# Patient Record
Sex: MALE | Race: WHITE | Employment: OTHER | URBAN - NONMETROPOLITAN AREA
[De-identification: names, ages, dates, MRNs, and addresses within clinical notes are randomized per-mention and may not be internally consistent; named-entity substitution may affect disease eponyms.]

---

## 2017-01-01 ENCOUNTER — OFFICE VISIT (OUTPATIENT)
Dept: FAMILY MEDICINE CLINIC | Age: 82
End: 2017-01-01
Payer: MEDICARE

## 2017-01-01 ENCOUNTER — TELEPHONE (OUTPATIENT)
Dept: FAMILY MEDICINE CLINIC | Age: 82
End: 2017-01-01

## 2017-01-01 ENCOUNTER — OFFICE VISIT (OUTPATIENT)
Dept: FAMILY MEDICINE CLINIC | Age: 82
End: 2017-01-01

## 2017-01-01 VITALS
HEART RATE: 88 BPM | SYSTOLIC BLOOD PRESSURE: 132 MMHG | HEIGHT: 66 IN | RESPIRATION RATE: 16 BRPM | BODY MASS INDEX: 20.57 KG/M2 | TEMPERATURE: 97.2 F | WEIGHT: 128 LBS | DIASTOLIC BLOOD PRESSURE: 78 MMHG

## 2017-01-01 VITALS
WEIGHT: 133 LBS | BODY MASS INDEX: 20.68 KG/M2 | HEART RATE: 100 BPM | RESPIRATION RATE: 16 BRPM | SYSTOLIC BLOOD PRESSURE: 134 MMHG | TEMPERATURE: 97.8 F | DIASTOLIC BLOOD PRESSURE: 62 MMHG

## 2017-01-01 VITALS
DIASTOLIC BLOOD PRESSURE: 76 MMHG | WEIGHT: 128 LBS | SYSTOLIC BLOOD PRESSURE: 122 MMHG | RESPIRATION RATE: 18 BRPM | HEART RATE: 80 BPM | TEMPERATURE: 98.2 F | BODY MASS INDEX: 19.9 KG/M2

## 2017-01-01 VITALS
WEIGHT: 126 LBS | DIASTOLIC BLOOD PRESSURE: 78 MMHG | TEMPERATURE: 97.7 F | BODY MASS INDEX: 20.25 KG/M2 | SYSTOLIC BLOOD PRESSURE: 128 MMHG | RESPIRATION RATE: 16 BRPM | HEART RATE: 88 BPM | HEIGHT: 66 IN

## 2017-01-01 DIAGNOSIS — R32 URINARY INCONTINENCE, UNSPECIFIED TYPE: ICD-10-CM

## 2017-01-01 DIAGNOSIS — F03.90 SENILE DEMENTIA WITHOUT BEHAVIORAL DISTURBANCE (HCC): Primary | ICD-10-CM

## 2017-01-01 DIAGNOSIS — M15.9 PRIMARY OSTEOARTHRITIS INVOLVING MULTIPLE JOINTS: ICD-10-CM

## 2017-01-01 DIAGNOSIS — E53.8 B12 DEFICIENCY: ICD-10-CM

## 2017-01-01 DIAGNOSIS — R53.81 PHYSICAL DECONDITIONING: ICD-10-CM

## 2017-01-01 DIAGNOSIS — D50.0 IRON DEFICIENCY ANEMIA SECONDARY TO BLOOD LOSS (CHRONIC): Primary | ICD-10-CM

## 2017-01-01 DIAGNOSIS — R29.6 FREQUENT FALLS: Primary | ICD-10-CM

## 2017-01-01 DIAGNOSIS — N40.0 PROSTATE HYPERTROPHY: ICD-10-CM

## 2017-01-01 DIAGNOSIS — R29.6 FREQUENT FALLS: ICD-10-CM

## 2017-01-01 DIAGNOSIS — R10.9 ACUTE RIGHT FLANK PAIN: Primary | ICD-10-CM

## 2017-01-01 LAB
ABSOLUTE BASO #: 0 /CMM (ref 0–200)
ABSOLUTE EOS #: 400 /CMM (ref 0–500)
ABSOLUTE LYMPH #: 800 /CMM (ref 1000–4800)
ABSOLUTE MONO #: 500 /CMM (ref 0–800)
ABSOLUTE NEUT #: 4600 /CMM (ref 1800–7700)
BASOPHILS RELATIVE PERCENT: 0.6 % (ref 0–2)
EOSINOPHILS RELATIVE PERCENT: 6 % (ref 0–6)
FERRITIN: 25 NG/ML (ref 24–336)
FOLATE: > 23.6 NG/ML
HCT VFR BLD CALC: 38.5 % (ref 40–49)
HEMOGLOBIN: 12.7 GM/DL (ref 13.5–16.5)
IRON SATURATION: 19 % (ref 20–50)
IRON, SERUM: 62 MCG/DL (ref 45–182)
LYMPHOCYTES RELATIVE PERCENT: 12.9 % (ref 15–45)
MCH RBC QN AUTO: 31.3 PG (ref 27.5–33)
MCHC RBC AUTO-ENTMCNC: 33 GM/DL (ref 33–36)
MCV RBC AUTO: 95.1 CU MIC (ref 80–97)
MONOCYTES RELATIVE PERCENT: 7.6 % (ref 2–10)
NEUTROPHILS RELATIVE PERCENT: 72.9 % (ref 40–70)
NUCLEATED RBCS: 0.1 /100 WBC
PDW BLD-RTO: 13.4 % (ref 12–16)
PLATELET # BLD: 302 TH/CMM (ref 150–400)
PROSTATE SPECIFIC ANTIGEN: 2.24 NG/ML
RBC # BLD: 4.05 MIL/CMM (ref 4.5–6)
TRANSFERRIN: 226 MG/DL (ref 180–329)
TSH REFLEX: 1.17 MCIU/ML (ref 0.49–4.67)
VITAMIN B-12: 923 PG/ML (ref 180–914)
WBC # BLD: 6.4 TH/CMM (ref 4.4–10.5)

## 2017-01-01 PROCEDURE — 99214 OFFICE O/P EST MOD 30 MIN: CPT | Performed by: NURSE PRACTITIONER

## 2017-01-01 PROCEDURE — 99213 OFFICE O/P EST LOW 20 MIN: CPT | Performed by: NURSE PRACTITIONER

## 2017-01-01 RX ORDER — CYANOCOBALAMIN 1000 UG/ML
1000 INJECTION INTRAMUSCULAR; SUBCUTANEOUS
Qty: 1 ML | Refills: 5 | Status: SHIPPED | OUTPATIENT
Start: 2017-01-01 | End: 2017-01-01 | Stop reason: SDUPTHER

## 2017-01-01 RX ORDER — CYANOCOBALAMIN 1000 UG/ML
1000 INJECTION INTRAMUSCULAR; SUBCUTANEOUS
Qty: 1 ML | Refills: 4 | Status: SHIPPED | OUTPATIENT
Start: 2017-01-01

## 2017-01-01 RX ORDER — TAMSULOSIN HYDROCHLORIDE 0.4 MG/1
0.4 CAPSULE ORAL DAILY
Qty: 30 CAPSULE | Refills: 3 | Status: SHIPPED | OUTPATIENT
Start: 2017-01-01

## 2017-01-01 RX ORDER — CYANOCOBALAMIN 1000 UG/ML
1000 INJECTION INTRAMUSCULAR; SUBCUTANEOUS
Qty: 1 ML | Refills: 4 | Status: SHIPPED | OUTPATIENT
Start: 2017-01-01 | End: 2017-01-01 | Stop reason: SDUPTHER

## 2017-01-03 ENCOUNTER — OFFICE VISIT (OUTPATIENT)
Dept: FAMILY MEDICINE CLINIC | Age: 82
End: 2017-01-03

## 2017-01-03 VITALS
DIASTOLIC BLOOD PRESSURE: 62 MMHG | BODY MASS INDEX: 20.93 KG/M2 | WEIGHT: 125.6 LBS | HEART RATE: 88 BPM | RESPIRATION RATE: 16 BRPM | TEMPERATURE: 97.7 F | HEIGHT: 65 IN | SYSTOLIC BLOOD PRESSURE: 110 MMHG

## 2017-01-03 DIAGNOSIS — D50.0 IRON DEFICIENCY ANEMIA SECONDARY TO BLOOD LOSS (CHRONIC): ICD-10-CM

## 2017-01-03 DIAGNOSIS — K21.9 GASTROESOPHAGEAL REFLUX DISEASE WITHOUT ESOPHAGITIS: ICD-10-CM

## 2017-01-03 DIAGNOSIS — A49.1 STREPTOCOCCUS VIRIDANS INFECTION: ICD-10-CM

## 2017-01-03 DIAGNOSIS — E53.8 FOLIC ACID DEFICIENCY: Primary | ICD-10-CM

## 2017-01-03 DIAGNOSIS — M15.9 PRIMARY OSTEOARTHRITIS INVOLVING MULTIPLE JOINTS: ICD-10-CM

## 2017-01-03 DIAGNOSIS — E27.8 ADRENAL MASS, LEFT (HCC): ICD-10-CM

## 2017-01-03 PROBLEM — I10 ESSENTIAL HYPERTENSION: Status: ACTIVE | Noted: 2017-01-03

## 2017-01-03 PROBLEM — F03.90 SENILE DEMENTIA WITHOUT BEHAVIORAL DISTURBANCE (HCC): Status: ACTIVE | Noted: 2017-01-03

## 2017-01-03 PROBLEM — M19.90 DEGENERATIVE JOINT DISEASE: Status: ACTIVE | Noted: 2017-01-03

## 2017-01-03 PROBLEM — E78.00 PURE HYPERCHOLESTEROLEMIA: Status: ACTIVE | Noted: 2017-01-03

## 2017-01-03 PROBLEM — Z86.73 HISTORY OF CVA (CEREBROVASCULAR ACCIDENT): Status: ACTIVE | Noted: 2017-01-03

## 2017-01-03 PROCEDURE — 99204 OFFICE O/P NEW MOD 45 MIN: CPT | Performed by: NURSE PRACTITIONER

## 2017-01-03 RX ORDER — FOLIC ACID 1 MG/1
1 TABLET ORAL DAILY
COMMUNITY
End: 2017-01-03 | Stop reason: SDUPTHER

## 2017-01-03 RX ORDER — TRAMADOL HYDROCHLORIDE 50 MG/1
50 TABLET ORAL EVERY 6 HOURS PRN
Qty: 30 TABLET | Refills: 2 | Status: SHIPPED | OUTPATIENT
Start: 2017-01-03 | End: 2017-01-01

## 2017-01-03 RX ORDER — PANTOPRAZOLE SODIUM 40 MG/1
40 GRANULE, DELAYED RELEASE ORAL
Qty: 30 EACH | Status: CANCELLED | OUTPATIENT
Start: 2017-01-03

## 2017-01-03 RX ORDER — PANTOPRAZOLE SODIUM 40 MG/1
40 TABLET, DELAYED RELEASE ORAL DAILY
Qty: 30 TABLET | Refills: 11 | Status: SHIPPED | OUTPATIENT
Start: 2017-01-03

## 2017-01-03 RX ORDER — ASPIRIN/CALCIUM/MAG/ALUMINUM 325 MG
TABLET ORAL DAILY PRN
COMMUNITY

## 2017-01-03 RX ORDER — PANTOPRAZOLE SODIUM 40 MG/1
40 GRANULE, DELAYED RELEASE ORAL
COMMUNITY
End: 2017-01-01 | Stop reason: SDUPTHER

## 2017-01-03 RX ORDER — DOCUSATE SODIUM 100 MG/1
100 CAPSULE, LIQUID FILLED ORAL 2 TIMES DAILY
COMMUNITY
End: 2017-01-01

## 2017-01-03 RX ORDER — ACETAMINOPHEN 325 MG/1
650 TABLET ORAL EVERY 6 HOURS PRN
COMMUNITY

## 2017-01-03 RX ORDER — FERROUS SULFATE 325(65) MG
325 TABLET ORAL
Qty: 90 TABLET | Refills: 11 | Status: SHIPPED | OUTPATIENT
Start: 2017-01-03 | End: 2018-01-01 | Stop reason: SDUPTHER

## 2017-01-03 RX ORDER — FOLIC ACID 1 MG/1
1 TABLET ORAL DAILY
Qty: 30 TABLET | Refills: 11 | Status: SHIPPED | OUTPATIENT
Start: 2017-01-03 | End: 2018-01-01 | Stop reason: SDUPTHER

## 2017-01-03 RX ORDER — TRAMADOL HYDROCHLORIDE 50 MG/1
50 TABLET ORAL EVERY 6 HOURS PRN
COMMUNITY
End: 2017-01-01

## 2017-01-03 RX ORDER — CYANOCOBALAMIN 1000 UG/ML
1000 INJECTION INTRAMUSCULAR; SUBCUTANEOUS
COMMUNITY
End: 2017-01-09 | Stop reason: SDUPTHER

## 2017-01-03 RX ORDER — FERROUS SULFATE 325(65) MG
325 TABLET ORAL
COMMUNITY
End: 2017-01-03 | Stop reason: SDUPTHER

## 2017-01-03 RX ORDER — CYANOCOBALAMIN 1000 UG/ML
1000 INJECTION INTRAMUSCULAR; SUBCUTANEOUS
Qty: 1 ML | Refills: 4 | Status: SHIPPED | OUTPATIENT
Start: 2017-01-03 | End: 2017-01-01 | Stop reason: SDUPTHER

## 2017-01-04 ENCOUNTER — TELEPHONE (OUTPATIENT)
Dept: FAMILY MEDICINE CLINIC | Age: 82
End: 2017-01-04

## 2017-01-04 DIAGNOSIS — I10 ESSENTIAL HYPERTENSION: ICD-10-CM

## 2017-01-04 DIAGNOSIS — F03.90 SENILE DEMENTIA WITHOUT BEHAVIORAL DISTURBANCE (HCC): Primary | ICD-10-CM

## 2017-01-04 DIAGNOSIS — D50.0 IRON DEFICIENCY ANEMIA SECONDARY TO BLOOD LOSS (CHRONIC): ICD-10-CM

## 2017-01-04 LAB
A/G RATIO: 2.5 (ref 1.5–2.5)
ALBUMIN SERPL-MCNC: 4.5 GM/DL (ref 3.5–5)
ALP BLD-CCNC: 61 IU/L (ref 41–137)
ALT SERPL-CCNC: 10 IU/L (ref 10–40)
ANION GAP SERPL CALCULATED.3IONS-SCNC: 9 MMOL/L (ref 4–12)
AST SERPL-CCNC: 20 IU/L (ref 15–41)
BILIRUB SERPL-MCNC: 0.2 MG/DL (ref 0.2–1)
BUN BLDV-MCNC: 22 MG/DL (ref 7–20)
CALCIUM SERPL-MCNC: 9.2 MG/DL (ref 8.8–10.5)
CHLORIDE BLD-SCNC: 101 MEQ/L (ref 101–111)
CO2: 26 MEQ/L (ref 21–32)
CREAT SERPL-MCNC: 0.72 MG/DL (ref 0.7–1.3)
CREATININE CLEARANCE: >60
FOLATE: 20.1 NG/ML
GLUCOSE: 95 MG/DL (ref 70–110)
POTASSIUM SERPL-SCNC: 4.4 MEQ/L (ref 3.6–5)
SODIUM BLD-SCNC: 136 MEQ/L (ref 135–145)
TOTAL PROTEIN: 6.3 G/DL (ref 6.2–8)
VITAMIN B-12: 474 PG/ML (ref 180–914)

## 2017-01-05 ENCOUNTER — TELEPHONE (OUTPATIENT)
Dept: FAMILY MEDICINE CLINIC | Age: 82
End: 2017-01-05

## 2017-01-05 LAB
ABSOLUTE BASO #: 0 /CMM (ref 0–200)
ABSOLUTE EOS #: 100 /CMM (ref 0–500)
ABSOLUTE LYMPH #: 800 /CMM (ref 1000–4800)
ABSOLUTE MONO #: 500 /CMM (ref 0–800)
ABSOLUTE NEUT #: 3500 /CMM (ref 1800–7700)
ANISOCYTOSIS: ABNORMAL
BASOPHILS RELATIVE PERCENT: 0.9 % (ref 0–2)
EOSINOPHILS RELATIVE PERCENT: 2.3 % (ref 0–6)
HCT VFR BLD CALC: 32.5 % (ref 40–49)
HEMOGLOBIN: 10.4 GM/DL (ref 13.5–16.5)
HYPOCHROMIA: ABNORMAL
LYMPHOCYTES RELATIVE PERCENT: 16.4 % (ref 15–45)
MCH RBC QN AUTO: 27 PG (ref 27.5–33)
MCHC RBC AUTO-ENTMCNC: 31.9 GM/DL (ref 33–36)
MCV RBC AUTO: 84.7 CU MIC (ref 80–97)
MONOCYTES RELATIVE PERCENT: 10 % (ref 2–10)
NEUTROPHILS RELATIVE PERCENT: 70.4 % (ref 40–70)
NUCLEATED RBCS: 0.1 /100 WBC
PDW BLD-RTO: 33.9 % (ref 12–16)
PLATELET # BLD: 258 TH/CMM (ref 150–400)
RBC # BLD: 3.84 MIL/CMM (ref 4.5–6)
SLIDE REVIEW: NORMAL
WBC # BLD: 4.9 TH/CMM (ref 4.4–10.5)

## 2017-01-06 ENCOUNTER — NURSE ONLY (OUTPATIENT)
Dept: FAMILY MEDICINE CLINIC | Age: 82
End: 2017-01-06

## 2017-01-06 DIAGNOSIS — E53.8 B12 DEFICIENCY: Primary | ICD-10-CM

## 2017-01-06 PROCEDURE — 96372 THER/PROPH/DIAG INJ SC/IM: CPT | Performed by: NURSE PRACTITIONER

## 2017-01-06 RX ORDER — CYANOCOBALAMIN 1000 UG/ML
1000 INJECTION INTRAMUSCULAR; SUBCUTANEOUS ONCE
Status: COMPLETED | OUTPATIENT
Start: 2017-01-06 | End: 2017-01-06

## 2017-01-06 RX ADMIN — CYANOCOBALAMIN 1000 MCG: 1000 INJECTION INTRAMUSCULAR; SUBCUTANEOUS at 13:55

## 2017-01-09 ENCOUNTER — TELEPHONE (OUTPATIENT)
Dept: FAMILY MEDICINE CLINIC | Age: 82
End: 2017-01-09

## 2017-01-09 DIAGNOSIS — E53.8 B12 DEFICIENCY: Primary | ICD-10-CM

## 2017-01-09 DIAGNOSIS — E53.8 B12 DEFICIENCY: ICD-10-CM

## 2017-01-09 RX ORDER — CYANOCOBALAMIN 1000 UG/ML
1000 INJECTION INTRAMUSCULAR; SUBCUTANEOUS
Qty: 1 ML | Refills: 5 | Status: SHIPPED | OUTPATIENT
Start: 2017-01-09 | End: 2017-01-09 | Stop reason: SDUPTHER

## 2017-01-09 RX ORDER — CYANOCOBALAMIN 1000 UG/ML
1000 INJECTION INTRAMUSCULAR; SUBCUTANEOUS
Qty: 1 ML | Refills: 5 | Status: SHIPPED | OUTPATIENT
Start: 2017-01-09 | End: 2017-01-01 | Stop reason: SDUPTHER

## 2017-03-02 ENCOUNTER — OFFICE VISIT (OUTPATIENT)
Dept: FAMILY MEDICINE CLINIC | Age: 82
End: 2017-03-02

## 2017-03-02 VITALS
HEART RATE: 88 BPM | DIASTOLIC BLOOD PRESSURE: 62 MMHG | SYSTOLIC BLOOD PRESSURE: 118 MMHG | HEIGHT: 67 IN | WEIGHT: 133 LBS | BODY MASS INDEX: 20.88 KG/M2 | RESPIRATION RATE: 16 BRPM | TEMPERATURE: 97.7 F

## 2017-03-02 DIAGNOSIS — Z71.89 DNR (DO NOT RESUSCITATE) DISCUSSION: ICD-10-CM

## 2017-03-02 DIAGNOSIS — D50.0 IRON DEFICIENCY ANEMIA SECONDARY TO BLOOD LOSS (CHRONIC): Primary | ICD-10-CM

## 2017-03-02 DIAGNOSIS — E53.8 FOLIC ACID DEFICIENCY: ICD-10-CM

## 2017-03-02 DIAGNOSIS — E53.8 B12 DEFICIENCY: ICD-10-CM

## 2017-03-02 DIAGNOSIS — K63.89 COLONIC MASS: ICD-10-CM

## 2017-03-02 PROCEDURE — 3288F FALL RISK ASSESSMENT DOCD: CPT | Performed by: NURSE PRACTITIONER

## 2017-03-02 PROCEDURE — 99214 OFFICE O/P EST MOD 30 MIN: CPT | Performed by: NURSE PRACTITIONER

## 2017-03-02 PROCEDURE — G8510 SCR DEP NEG, NO PLAN REQD: HCPCS | Performed by: NURSE PRACTITIONER

## 2017-03-02 ASSESSMENT — PATIENT HEALTH QUESTIONNAIRE - PHQ9
SUM OF ALL RESPONSES TO PHQ QUESTIONS 1-9: 0
SUM OF ALL RESPONSES TO PHQ9 QUESTIONS 1 & 2: 0
2. FEELING DOWN, DEPRESSED OR HOPELESS: 0
1. LITTLE INTEREST OR PLEASURE IN DOING THINGS: 0

## 2017-04-04 ENCOUNTER — TELEPHONE (OUTPATIENT)
Dept: FAMILY MEDICINE CLINIC | Age: 82
End: 2017-04-04

## 2017-04-04 DIAGNOSIS — D50.0 IRON DEFICIENCY ANEMIA SECONDARY TO BLOOD LOSS (CHRONIC): ICD-10-CM

## 2017-04-04 DIAGNOSIS — I10 ESSENTIAL HYPERTENSION: ICD-10-CM

## 2017-04-04 DIAGNOSIS — E53.8 B12 DEFICIENCY: Primary | ICD-10-CM

## 2017-04-04 DIAGNOSIS — E53.8 FOLIC ACID DEFICIENCY: ICD-10-CM

## 2017-04-04 DIAGNOSIS — F03.90 SENILE DEMENTIA WITHOUT BEHAVIORAL DISTURBANCE (HCC): ICD-10-CM

## 2017-11-09 NOTE — TELEPHONE ENCOUNTER
Call Geena Clemente, Jey's wife, to see if he went to the ER at [facility] yesterday like they were directed to. She reports she went over to the ER and sounds like maybe outpatient lab, but they never got seen by a doctor or nurse practitioner. They waited around for 2 hours to see what orders were supposed to be done, they apparently thought I had sent orders over for them to get done, but when nothing was done, they left and went home. Geena Clemetne reports Felisa Buggy dementia is getting worse, and she has to help him with everything. She is weak herself and can barely lift him. She feels like he needs to be in a nursing home. Is still incontinent of urine, but did actually urinate in the waste paper basket. Directed Geena Clemente to take him back to the ER at [facility] for evaluation by a doctor, suspect fractures, possible UTI and/or pyelonephritis and needs further testing as he has been more confused, weak, incontinent and falling frequently. She verbalized understanding.

## 2017-11-28 NOTE — PROGRESS NOTES
Chief Complaint   Patient presents with    Hypertension     6 month F/U HTN and other chronic conditions. History obtained from chart review and the patient. SUBJECTIVE:  Bernard Raman is a 80 y.o. male that presents today for follow up chronic medical conditions. Since last visit, the patient was seen in the ER at [facility] for right back pain after frequent falls. He got evaluated, had xrays, urine tests labs etc and everything was within normal range. Was diagnosed with back strain/contusion. Wife reports that Jordana Tavera wets himself frequently. He denies this. He states that he has a good urine stream, denies any dribbling. He reports that he is usually able to make it to the bathroom. He denies any nocturia. He states that he does not wet himself at night. Dementia    HPI:    History is obtained from the patient and spouse. Spouse reports that he was on medications in the past for the dementia and they actually made things worse. New concerns - wife reports that he can't find his way to the bedroom, is having incontinence problems  Since last visit, memory has been getting worse. In terms of ADLs, he needs no help with anything. In terms of IADLs he needs complete help with answering telephone, finances, everything. Driving - unable to drive. Continence - abnormal: having incontinence of urine, but bowels moving normally.     The patient is left alone for 1-2 hours at the most.    Ambulation is normal.         Age/Gender Health Maintenance    Lipid - n/a  DM Screen - n/a  Colon Cancer Screening - needs but refuses  Lung Cancer Screening (Age 54 to [de-identified] with 30 pack year hx, current smoker or quit within past 15 years) - n/a    Tetanus - needs  Influenza Vaccine - needs  Pneumonia Vaccine - needs  Zostavax - needs     PSA testing discussion - n/a  AAA Screening - n/a    Falls screening - needs    Current Outpatient Prescriptions   Medication Sig Dispense Refill    tamsulosin (FLOMAX) Polyuria    Lab Results   Component Value Date     11/09/2017    K 3.7 11/09/2017    CL 99 (L) 11/09/2017    CO2 28 11/09/2017    BUN 22 (H) 11/09/2017    CREATININE 0.75 11/09/2017    GLUCOSE 107 11/09/2017    CALCIUM 9.4 11/09/2017    PROT 7.2 11/09/2017    LABALBU 4.7 11/09/2017    BILITOT 0.8 11/09/2017    ALKPHOS 64 11/09/2017    AST 22 11/09/2017    ALT 12 11/09/2017    AGRATIO 1.9 11/09/2017       Lab Results   Component Value Date    WBC 10.7 (H) 11/09/2017    HGB 13.7 11/09/2017    HCT 41.2 11/09/2017    MCV 96.3 11/09/2017     11/09/2017     Lab Results   Component Value Date    FOLATE > 23.6 06/06/2017     Lab Results   Component Value Date    CVGGDRHF36 923 (H) 06/06/2017     Lab Results   Component Value Date    FERRITIN 25 06/06/2017     Serum iron-62  Iron saturation-19  Transferrin-226    PHYSICAL EXAM:  Vitals:    11/28/17 1444   BP: 128/78   Site: Right Arm   Position: Sitting   Cuff Size: Medium Adult   Pulse: 88   Resp: 16   Temp: 97.7 °F (36.5 °C)   Weight: 126 lb (57.2 kg)   Height: 5' 5.5\" (1.664 m)     Body mass index is 20.65 kg/m². VS Reviewed  General Appearance: A&O x 3, No acute distress,well developed and well- nourished  Head: normocephalic and atraumatic  Eyes: pupils equal, round, and reactive to light, extraocular eye movements intact, conjunctivae and eye lids without erythema  Neck: supple and non-tender without mass, no thyromegaly or thyroid nodules, no cervical lymphadenopathy  Pulmonary/Chest: clear to auscultation bilaterally- no wheezes, rales or rhonchi, normal air movement, no respiratory distress or retractions  Cardiovascular: S1 and S2 auscultated w/ RRR. No murmurs, rubs, clicks, or gallops, distal pulses intact. Abdomen: soft, non-tender, non-distended, bowl sounds physiologic,  no rebound or guarding, no masses or hernias noted. Liver and spleen without enlargement.    Extremities: no cyanosis, clubbing or edema of the lower extremities  Musculoskeletal: No joint swelling or gross deformity   Neuro:  Alert, 5/5 strength globally and symmetrically  Psych: Affect appropriate. Mood normal. Thought process is normal without evidence of depression or psychosis. Good insight and appropriate interaction. Cognition and memory appear to be intact. Skin: warm and dry, no rash or erythema  Lymph:  No cervical, auricular or supraclavicular lymph nodes palpated  Prostate: enlarged, flattened central sulcus, non-nodular morphology    ASSESSMENT & PLAN  Jey was seen today for hypertension. Diagnoses and all orders for this visit:    Senile dementia without behavioral disturbance  -     TSH With Reflex Ft4; Future    Urinary incontinence, unspecified type  -     US Renal Complete; Future  -     PSA Screening; Future  -     tamsulosin (FLOMAX) 0.4 MG capsule; Take 1 capsule by mouth daily    Prostate hypertrophy  -     PSA Screening; Future  -     tamsulosin (FLOMAX) 0.4 MG capsule; Take 1 capsule by mouth daily       Reviewed [facility]-has NOT had MRI of brain or CT, would benefit from this  Will plan to perform MMSE or CAGE questionnaire at next appt  Dementia likely due to aging and Hx of ETOH abuse  Urinary incontinence likely due to overflow-will start on Flomax and obtain PSA  Will obtain renal US to check for hydronephrosis    DISPOSITION    Return in about 1 week (around 12/5/2017) for dementia. Jewell Laron released without restrictions. PATIENT COUNSELING    Counseling was provided today regarding the following topics: Healthy eating habits, Regular exercise, substance abuse and healthy sleep habits. Jey received counseling on the following healthy behaviors: medication adherence    Patient given educational materials on: See Attached    I have instructed Jey to complete a self tracking handout on none and instructed them to bring it with them to his next appointment.      Barriers to learning and self management:

## 2017-12-26 NOTE — PROGRESS NOTES
Have you changed or started any medications since your last visit including any over-the-counter medicines, vitamins, or herbal medicines? no   Are you having any side effects from any of your medications? -  no  Have you stopped taking any of your medications? Is so, why? -  no    Have you seen any other physician or provider since your last visit? No  Have you had any other diagnostic tests since your last visit? No  Have you been seen in the emergency room and/or had an admission to a hospital since we last saw you? No  Have you had your routine dental cleaning in the past 6 months? no    Have you activated your [employer] account? If not, what are your barriers? No:      Patient Care Team:  Davidpatricia Nair CNP as PCP - General (Family Medicine)    Medical History Review  Past Medical, Family, and Social History     Defer to provider.

## 2017-12-26 NOTE — PROGRESS NOTES
Chief Complaint   Patient presents with    Follow-up     dementia worse        History obtained from chart review and the patient. SUBJECTIVE:  Ridge Thornton is a 80 y.o. male that presents today for follow up dementia    Folstein Mini Mental Status Examination (FMMSE)  Score:  10/30    Place: no  Location: no  City: no  State: yes  County: no  Time: yes  Day: yes  Date: no  Month: no  Year: no  Recall (immediate) 3 items: yes  Spell WORLD backwards or serial sevens (93, 86, 79, 72, 65):  no  Naming pen: yes  Naming watch: yes  Recall 3 items: no  Repeat: \"no ifs, no ands, no buts\": no  Take paper, fold in half, place on table: yes  Written: \"close your eyes\": no-unable to read  Write a sentence: no-unable to read  Copy drawing of 2 pentagons: no        Dementia    HPI:    History is obtained from the patient and spouse. Spouse reports that he was on medications in the past for the dementia and they actually made things worse. Spouse reports that it's been quite a few years, but actually did better off the medications than on the medications  New concerns - wife reports that he can't find his way to the bedroom, is having incontinence problems  Since last visit, memory has been getting worse. In terms of ADLs, he needs no help with anything. In terms of IADLs he needs complete help with answering telephone, finances, everything. Driving - unable to drive. Continence - abnormal: having incontinence of urine, but bowels moving normally.     The patient is left alone for 1-2 hours at the most.    Ambulation is normal.         Age/Gender Health Maintenance    Lipid - n/a  DM Screen - n/a  Colon Cancer Screening - needs but refuses  Lung Cancer Screening (Age 54 to [de-identified] with 30 pack year hx, current smoker or quit within past 15 years) - n/a    Tetanus - needs  Influenza Vaccine - needs  Pneumonia Vaccine - needs  Zostavax - needs     PSA testing discussion - n/a  AAA Screening - n/a    Falls screening Polyuria    Lab Results   Component Value Date     11/09/2017    K 3.7 11/09/2017    CL 99 (L) 11/09/2017    CO2 28 11/09/2017    BUN 22 (H) 11/09/2017    CREATININE 0.75 11/09/2017    GLUCOSE 107 11/09/2017    CALCIUM 9.4 11/09/2017    PROT 7.2 11/09/2017    LABALBU 4.7 11/09/2017    BILITOT 0.8 11/09/2017    ALKPHOS 64 11/09/2017    AST 22 11/09/2017    ALT 12 11/09/2017    AGRATIO 1.9 11/09/2017       Lab Results   Component Value Date    WBC 10.7 (H) 11/09/2017    HGB 13.7 11/09/2017    HCT 41.2 11/09/2017    MCV 96.3 11/09/2017     11/09/2017     Lab Results   Component Value Date    FOLATE > 23.6 06/06/2017     Lab Results   Component Value Date    [id number] 923 (H) 06/06/2017     Lab Results   Component Value Date    FERRITIN 25 06/06/2017     Serum iron-62  Iron saturation-19  Transferrin-226    PHYSICAL EXAM:  Vitals:    12/26/17 1351   BP: (!) 142/72   Pulse: 88   Resp: 16   Temp: 97.2 °F (36.2 °C)   TempSrc: Oral   Weight: 128 lb (58.1 kg)   Height: 5' 6\" (1.676 m)     Body mass index is 20.66 kg/m². VS Reviewed  General Appearance: A&O x 3, No acute distress,well developed and well- nourished  Head: normocephalic and atraumatic  Eyes: pupils equal, round, and reactive to light, extraocular eye movements intact, conjunctivae and eye lids without erythema  Neck: supple and non-tender without mass, no thyromegaly or thyroid nodules, no cervical lymphadenopathy  Pulmonary/Chest: clear to auscultation bilaterally- no wheezes, rales or rhonchi, normal air movement, no respiratory distress or retractions  Cardiovascular: S1 and S2 auscultated w/ RRR. No murmurs, rubs, clicks, or gallops, distal pulses intact. Abdomen: soft, non-tender, non-distended, bowl sounds physiologic,  no rebound or guarding, no masses or hernias noted. Liver and spleen without enlargement.    Extremities: no cyanosis, clubbing or edema of the lower extremities  Musculoskeletal: No joint swelling or gross deformity Neuro:  Alert, 5/5 strength globally and symmetrically  Psych: Affect appropriate. Mood normal. Thought process is normal without evidence of depression or psychosis. Good insight and appropriate interaction. Cognition and memory appear to be intact. Skin: warm and dry, no rash or erythema  Lymph:  No cervical, auricular or supraclavicular lymph nodes palpated  Prostate: enlarged, flattened central sulcus, non-nodular morphology    ASSESSMENT & PLAN  Jey was seen today for follow-up. Diagnoses and all orders for this visit:    Senile dementia without behavioral disturbance  -     MRI BRAIN WO CONTRAST; Future  -     Jay Mcneil MD     has severe dementia  Has never had any imaging (that we can find) so will proceed with neuroimaging  Has been on medications in the past for dementia, spouse feels like they made him worse, so will refer to Neurology for further management      DISPOSITION    Return in about 4 weeks (around 1/23/2018) for dementia. Justine Mac released without restrictions. PATIENT COUNSELING    Counseling was provided today regarding the following topics: Healthy eating habits, Regular exercise, substance abuse and healthy sleep habits. Jey received counseling on the following healthy behaviors: medication adherence    Patient given educational materials on: See Attached    I have instructed Jey to complete a self tracking handout on none and instructed them to bring it with them to his next appointment. Barriers to learning and self management: none    Discussed use, benefit, and side effects of prescribed medications. Barriers to medication compliance addressed. All patient questions answered. Pt voiced understanding.        Electronically signed by Alf Khan CNP on 12/26/2017 at 2:33 PM

## 2018-01-01 ENCOUNTER — TELEPHONE (OUTPATIENT)
Dept: FAMILY MEDICINE CLINIC | Age: 83
End: 2018-01-01

## 2018-01-01 DIAGNOSIS — D50.0 IRON DEFICIENCY ANEMIA DUE TO CHRONIC BLOOD LOSS: ICD-10-CM

## 2018-01-01 RX ORDER — FOLIC ACID 1 MG/1
1 TABLET ORAL DAILY
Qty: 30 TABLET | Refills: 11 | Status: SHIPPED | OUTPATIENT
Start: 2018-01-01

## 2018-01-01 RX ORDER — FERROUS SULFATE 325(65) MG
TABLET ORAL
Qty: 90 TABLET | Refills: 11 | Status: SHIPPED | OUTPATIENT
Start: 2018-01-01

## 2018-01-08 NOTE — TELEPHONE ENCOUNTER
Pt's wife informed and  Verbalized understanding. She said that Abmayito Perri does have an appointment scheduled with neuro.

## 2018-01-30 NOTE — TELEPHONE ENCOUNTER
Larisa Starr called requesting a refill on the following medications:  Requested Prescriptions     Pending Prescriptions Disp Refills    folic acid (FOLVITE) 1 MG tablet 30 tablet 11     Sig: Take 1 tablet by mouth daily     Pharmacy verified:  [email or web address]      Date of last visit: 12/26/17  Date of next visit (if applicable): 6/7/9304

## 2024-10-04 NOTE — PROGRESS NOTES
Patient is calling because her generic prescription Is more now, its over 100& she is not sure if there is anything else she can take   Affect normal, no evidence of depression or anxiety    ASSESSMENT & PLAN  Sonja Bowers was seen today for back pain. Diagnoses and all orders for this visit:    Acute right flank pain    Frequent falls    report called to Jose Juan Herron PA-C at [facility] ER  Patient needs further workup in the ER  Suspect pyelonephritis as the cause of his back pain, but unable to urinate  Will send to ER, patient prefers [facility], will needs labs, xrays, may need straight cath    Return if symptoms worsen or fail to improve. Jey received counseling on the following healthy behaviors: medication adherence  Reviewed prior labs and health maintenance. Continue current medications, diet and exercise. Discussed use, benefit, and side effects of prescribed medications. Barriers to medication compliance addressed. Patient given educational materials - see patient instructions. All patient questions answered. Patient voiced understanding.